# Patient Record
Sex: MALE | Race: WHITE | NOT HISPANIC OR LATINO | URBAN - METROPOLITAN AREA
[De-identification: names, ages, dates, MRNs, and addresses within clinical notes are randomized per-mention and may not be internally consistent; named-entity substitution may affect disease eponyms.]

---

## 2023-10-30 ENCOUNTER — TELEMEDICINE CLINICAL SUPPORT (OUTPATIENT)
Dept: GENETICS | Facility: CLINIC | Age: 43
End: 2023-10-30
Payer: COMMERCIAL

## 2023-10-30 DIAGNOSIS — Z84.81 FAMILY HISTORY OF CARRIER OF GENETIC DISEASE: ICD-10-CM

## 2023-10-30 DIAGNOSIS — Z80.3 FAMILY HISTORY OF MALIGNANT NEOPLASM OF BREAST: ICD-10-CM

## 2023-10-30 DIAGNOSIS — Z80.0: ICD-10-CM

## 2023-10-30 DIAGNOSIS — Z71.83 ENCOUNTER FOR NONPROCREATIVE GENETIC COUNSELING: ICD-10-CM

## 2023-10-30 PROCEDURE — 98968 PH1 ASSMT&MGMT NQHP 21-30: CPT

## 2023-10-30 NOTE — PROGRESS NOTES
HISTORY OF PRESENT ILLNESS:  - Mr. Matthew Heredia is a 43 y.o. male with a family history of a known PALB2 germline pathogenic variant.  - he was self referred to the Cancer Genetics Clinic at [facility] after receiving a family letter from his mother.   - Matthew is interested in genetic testing to clarify potential treatment options, as well as personal and familial risks for cancer.   - The appointment today was conducted via video chat due to the current COVID-19 pandemic.     CANCER MEDICAL HISTORY:  Personal History of Cancer?    None reported   Other ongoing medical concerns?  None reported    PREVIOUS GENETIC TESTING?  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 This testing was done at  with genetic counselor, YOMI Gómez.    CANCER SCREENING HISTORY:  PSA:   Annual through [facility]; both last year and this year were reportedly normal  Gastrointestinal cancer screening:  Colonoscopy?  None reported  EGD?  None reported   Other cancer screening:  Dermatology?  None reported; had a mole removed many years ago; benign     SOCIAL HISTORY:  -None reported              FAMILY HISTORY:  A four-generation pedigree was obtained and was significant for the following:  -Mother (living, 67) was diagnosed with breast cancer in her mid 60s and is otherwise overall healthy. She had genetic testing in February 2023 that identified a athogenic PALB2 mutation called c.172_175del (p.[id number]*7). She is now planning for prophylactic oophorectomy. Per family letter that Mr. Heredia shared, [id number].  -Sister (living, 39) was negative for the familial PALB2 mutation, and brother (living, 37) has not yet had testing.   -Maternal aunt, 3 maternal uncles, and maternal cousins have not yet had genetic testing for the familial PALB2 mutation; one maternal aunt had testing and is negative. None of these relatives have had cancer.   -Maternal  grandfather (, 70s)  of colon cancer that was diagnosed shortly before he passed.  -Maternal great grandmother (, unknown age, mother of maternal grandfather)  of pancreatic cancer.   -No paternal family history of cancer.  Consanguinity and Ashkenazi Spiritism ancestry were denied. The patient's maternal side is of  descent, and the patient's paternal side is of / descent. The remainder of the family history was negative for intellectual disability, birth defects, miscarriages/stillbirths, blindness, deafness, kidney disease, heart disease, cancer, muscle disease, and blood disorders.    GENETIC COUNSELING/DISCUSSION  Mr. Matthew Heredia is a 43 y.o. male with a family history of a known change in the PALB2 gene.  Based on this known change, Matthew meets NCCN criteria for testing of the PALB2 gene. he is interested in testing, which is recommended, and was ordered today via single-gene PALB2 testing from [facility]. Our discussion is summarized below.    We reviewed genes and the concept of hereditary cancer. We discussed that most cancers are not due to an inherited genetic susceptibility. However, in about 5-10% of cases, there is an inherited genetic mutation that can make a person more susceptible to developing certain types of cancer. Within families with a genetic predisposition to cancer, we often see certain patterns, such as multiple family members with cancer and cancers occurring in multiple generations. In addition, earlier onset and/or bilateral cancers are suggestive of an inherited predisposition. There also tends to be a clustering of certain types of cancer in these families, such as breast cancer and pancreatic cancer.     We discussed the PALB2 gene, which has been linked to hereditary cancer predisposition. Changes in this gene (sometimes referred to as mutations) are inherited in a dominant pattern and confer up to a 60% lifetime risk for  breast cancer. This is elevated compared to the general population risk of 10-12%. There is also a 0.9% chance of developing male breast cancer. In addition, PALB2 mutation carriers have up to a 3-5% lifetime risk for ovarian cancer, which is elevated over the 2% general population risk. Mutation carriers who have already been diagnosed with cancer have an increased risk to develop a second, contralateral breast cancer. Finally, there is a 2-5% chance of developing a pancreatic cancer. After a conversation with a high risk pancreatic cancer screening team, if Matthew is positive for the same PALB2 mutation as his mother, he may consider pancreatic MRI/endoscopic ultrasound as early as 10 years prior to the youngest diagnosis in the family.     We discussed that there are multiple genes associated with increased cancer risk. Some genes are considered highly penetrant cancer genes, meaning a mutation in the gene confers a high risk of cancer. Additionally, there are other intermediate (moderate risk) cancer genes. For some of the moderate risk genes, there is often limited information regarding the degree to which a mutation in the gene affects risk of different types of cancers. Additionally, for some of these moderate risk genes, the appropriate management for individuals who have a mutation in one of these genes is not always clear. Our knowledge about the cancer risks associated with mutations in these moderate risk genes is always growing, and we will likely be able to provide more comprehensive information in the future. While testing of additional risk factors is theoretically available, there is not a specific indication to consider broader testing based on personal or family history.      Most cancer predisposition genetic changes, such as those in PALB2, are inherited in an autosomal dominant fashion. This means that if an individual has a change in either of these genes, their siblings and children have a  50% chance of also having that gene change and a 50% chance of not having the gene change. Additionally, we discussed that because Matthew's relative did not have any additional changes on the same genetic panel we are ordering for him, we may learn genetic information about his father, too, should he consider broader testing.      Lastly, we discussed the Genetic Information Non-discrimination Act (CARLOS) of 2008. We discussed that per this federal law, employers (at companies with 15 employees or greater) and health insurance companies (barring [employer] and other  insurances) are forbidden to ask for and use genetic information against another person. As such, health insurance companies cannot ask for genetic information and use findings affect coverage or rates. However, luxury insurances such as life insurance, long term care insurance, and/or private disability insurance companies are not forbidden against using genetic information when an individual takes out a new/additional policy in one of those areas. As such, for unaffected individuals it could be beneficial to explore/take out policies in luxury insurance areas PRIOR to undergoing genetic testing.     Matthew was counseled about hereditary cancer susceptibility including cancer risks, options for increased screening and/or risk reduction, genetic testing, and the implications for other family members. We discussed performing genetic testing in the context of a multi-gene panel test that looks at PALB2, as well as moderate penetrant genes. he is interested in testing for just the known familial variant from [facility], which is where his mother was tested through.      Results are typically available within 2-3 weeks, and Matthew will return to the Cancer Genetics Clinic to discuss his testing results. At that time, we will make recommendations for both him and his family members in terms of cancer screening and/or cancer risk reduction options.       PLAN:  1. Matthew elected to undergo genetic testing via a panel test that analyzes just the PALB2 gene. Consent for testing was obtained verbally and an order for a saliva kit was placed through the laboratory's portal; a saliva kit will be shipped to the home and should arrive in 3-5 days. The sample will be sent to [facility] for analysis. Results are typically available in 2-3 weeks.     2. Matthew will return to the Cancer Genetics Clinic in approximately 2-3 weeks to discuss his test results.      3. We remain available to Matthew or his family members at [phone] if any questions arise regarding information discussed at today's visit.    Time spent telecounselin min    Sincerely,   Maritza Guerrero KINDRA     Reviewed by:   Dr. Tierney Jarvis MD  Clinical

## 2023-11-13 ENCOUNTER — TELEPHONE (OUTPATIENT)
Dept: GENETICS | Facility: CLINIC | Age: 43
End: 2023-11-13
Payer: COMMERCIAL

## 2023-12-19 ENCOUNTER — TELEMEDICINE CLINICAL SUPPORT (OUTPATIENT)
Dept: GENETICS | Facility: CLINIC | Age: 43
End: 2023-12-19
Payer: COMMERCIAL

## 2023-12-19 DIAGNOSIS — Z71.83 ENCOUNTER FOR NONPROCREATIVE GENETIC COUNSELING: ICD-10-CM

## 2023-12-19 DIAGNOSIS — Z15.89 MONOALLELIC MUTATION OF PALB2 GENE: ICD-10-CM

## 2023-12-19 DIAGNOSIS — Z15.09 MONOALLELIC MUTATION OF PALB2 GENE: ICD-10-CM

## 2023-12-19 DIAGNOSIS — Z15.01 MONOALLELIC MUTATION OF PALB2 GENE: ICD-10-CM

## 2023-12-19 PROCEDURE — 98967 PH1 ASSMT&MGMT NQHP 11-20: CPT

## 2023-12-19 NOTE — LETTER
Dear Family of Mr. Matthew Heredia,    A member of your family, Matthew Heredia (: 1980), was recently seen at the [facility] to discuss his genetic test results. he had genetic testing to determine whether he carried an altered gene that could explain the family history of cancer.    Genetic testing (a blood test) through [facility] (lab) demonstrated that a genetic alteration (mutation) was present in a gene known as PALB2.  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  Because PALB2 mutations are passed through families, his relatives may be at increased cancer risk.  The PALB2 mutation has been proven to come from his mother's side.     We know that cancer is a relatively common disease in the general population.  However, in some families with cancer, the cancer is due to an alteration in a gene that can be passed down from one generation to the next.  In your family, the family history of cancer appears to be due to a PALB2 mutation.    Because a PALB2 mutation has been identified in your relative, you may have also inherited this gene change.  Each sibling and child of someone with a known PALB2 mutation has a 50% chance of inheriting that same genetic change.  The parents of a person with a known PALB2 mutation also have a 50% chance to have the same mutation.    It is important to stress that inheriting an alteration (mutation) in the PALB2 gene does not mean that an individual will develop cancer.  Rather, inheriting a PALB2 mutation places a person at increased risk for cancer compared to someone who does not have this mutation.    Changes in the PALB2 gene are known to increase the risk for certain types of cancers.  These include an increased risk for breast cancer, pancreatic cancer, and ovarian cancer. Women with a PALB2 mutation have a higher lifetime risk for developing breast and ovarian  cancers compared to the average woman.      Men and women who carry a PALB2 mutation also have an increased risk for pancreatic cancer.    There are a number of options available to individuals with a PALB2 mutation to manage their cancer risks.  These include frequent cancer screening, preventive surgeries, and certain medications which can lower the risk for cancer.  Before considering which option is most appropriate, we encourage individuals to meet with a medical geneticist and/or genetic counselor to discuss the option of genetic testing, and whether additional options are necessary.      If you live locally and would like to pursue testing, I or any of my colleagues would be glad to see you here at .  You can call [phone], option 1 to schedule an appointment for genetic counseling.  If you live outside of the [facility], we encourage you to share this information with your personal physician who could direct you to a genetics specialist in your area.  You will need to bring a copy of Matthew's test report (it says [place] at the top) with you to your appointment.    Additionally, you can call me at the [facility] at [phone] to address your questions and concerns, discuss the option of testing in further detail, and/or to provide you with the name of a genetics professional in your area.  You may also find a genetics specialist by visiting the National Society for Genetic Counselors website at: http://www.nsgc.org/ under “Finding a Genetic Counselor,” with “Cancer” specified under special area.    Sincerely,  Maritza Guerrero [facility]

## 2023-12-20 NOTE — PROGRESS NOTES
. Matthew Heredia initially presented for genetic counseling on 10/30/2023. Based on his family history of a known mutation in PALB2 in his relative, he decided to undergo genetic testing. Testing was ordered via single-gene testing of the PALB2 gene from [facility]. He returns for virtual genetic counseling today to review the results of this testing, and our discussion of his results is summarized below.     Mr. 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 This result is regarded as clinically significant.          For men with a PALB2 mutation, the risk of developing a male breast cancer by age 70 is 0.9% per the National Comprehensive Cancer Network (NCCN) guidelines. Women with a mutation in the PALB2 gene have on average a 41-60% lifetime risk of female breast cancer, which is increased compared to the 12% lifetime risk for breast cancer in the general population. Women with PALB2 mutations are also at a 3-5% chance of developing ovarian cancer and can consider a risk reducing oophorectomy at age 45 or later.     Monoallelic mutations in PALB2 have also been associated with a 5-10% lifetime risk of pancreatic cancer. Current NCCN guidelines for PALB2 mutation carriers only recommend pancreatic cancer screening for those with a known family history of pancreatic cancer. However, we discussed a referral to a pancreatic specialist here at , named Dr Jason Pineda. Mr. Heredia may be a candidate for The Cancer of the Pancreas Screening-5 (CAPS5) study, which is currently enrolling participants through [facility], along with other sites around the country. Having a PALB2 mutation alone may qualify him for this study that includes pancreatic cancer screening. The patient declined the referral to Dr. Pineda today, and her was informed that he may reach out to the genetics clinic in the future  if he would like to reconsider at a later date,      We discussed that he should follow all other age-related cancer screenings per the recommendations of his PCP when age appropriate, such as PSA, colonoscopy, etc.     Mutations in PALB2 are inherited in an autosomal dominant manner. Each of Mr. Heredia's children and siblings have a 50% chance to have the same PALB2 mutation. We recommend that they pursue genetic counseling and consider their own genetic testing when the time is appropriate, typically not until after age 18.     A single PALB2 mutation is associated with increased cancer risk. If a person has mutations in both copies of the PALB2 gene, a condition known as Fanconi Anemia (FA) results. Individuals with this disorder can have birth defects, poor growth, developmental delay/ intellectual disability, bone marrow failure, and increased risk of leukemia. If both members of a couple have PALB2 mutations, their chance to have a child with FA is 25% with each pregnancy. Any future reproductive partners of Mr. Heredia or his children could consider PALB2 testing to determine if they are also a carrier, as this may impact reproductive/family planning decisions.      Of note, if any family members choose to be tested, they will need a copy of Mr. Heredia's results. If interested, relatives may contact our program, at [phone], if they live in the area and would like to schedule their own genetic counseling appointment. Relatives may also visit [email or web address] to find a genetic counselor in their area. A family letter was written that he can share with his family members to assist with communicating this information to them.      The information we discussed and included in this note is what is known as of this date. Management recommendations will likely change as we gather more information about this particular gene, and we encourage Mr. Heredia to follow up with us every 2-3  years for updated information on PALB2. I will send him a copy of the documentation above, as well as a copy of his test report. I will have the records from all genetic counseling appointments faxed to his primary care providers's office: Dr. Bryce Win MD with [facility]. Please do not hesitate to contact me at [phone] with any updates to family history, questions, or concerns.     Time spent telecounselin min     Sincerely,   Maritza Guerrero MS, [facility] [place]  Licensed Genetic Counselor      Reviewed by:  Dr. Tierney Jarvis MD  Clinical   [facility]